# Patient Record
Sex: FEMALE | Race: BLACK OR AFRICAN AMERICAN | Employment: OTHER | URBAN - METROPOLITAN AREA
[De-identification: names, ages, dates, MRNs, and addresses within clinical notes are randomized per-mention and may not be internally consistent; named-entity substitution may affect disease eponyms.]

---

## 2017-01-01 ENCOUNTER — APPOINTMENT (OUTPATIENT)
Dept: ULTRASOUND IMAGING | Facility: HOSPITAL | Age: 47
DRG: 871 | End: 2017-01-01
Attending: INTERNAL MEDICINE
Payer: MEDICARE

## 2017-01-01 ENCOUNTER — APPOINTMENT (OUTPATIENT)
Dept: CT IMAGING | Facility: HOSPITAL | Age: 47
DRG: 871 | End: 2017-01-01
Attending: EMERGENCY MEDICINE
Payer: MEDICARE

## 2017-01-01 ENCOUNTER — APPOINTMENT (OUTPATIENT)
Dept: GENERAL RADIOLOGY | Facility: HOSPITAL | Age: 47
DRG: 871 | End: 2017-01-01
Attending: INTERNAL MEDICINE
Payer: MEDICARE

## 2017-01-01 ENCOUNTER — APPOINTMENT (OUTPATIENT)
Dept: GENERAL RADIOLOGY | Facility: HOSPITAL | Age: 47
DRG: 871 | End: 2017-01-01
Attending: EMERGENCY MEDICINE
Payer: MEDICARE

## 2017-01-01 ENCOUNTER — APPOINTMENT (OUTPATIENT)
Dept: INTERVENTIONAL RADIOLOGY/VASCULAR | Facility: HOSPITAL | Age: 47
DRG: 871 | End: 2017-01-01
Attending: INTERNAL MEDICINE
Payer: MEDICARE

## 2017-01-01 ENCOUNTER — HOSPITAL ENCOUNTER (INPATIENT)
Facility: HOSPITAL | Age: 47
LOS: 2 days | DRG: 871 | End: 2017-01-01
Attending: EMERGENCY MEDICINE | Admitting: HOSPITALIST
Payer: MEDICARE

## 2017-01-01 ENCOUNTER — APPOINTMENT (OUTPATIENT)
Dept: CV DIAGNOSTICS | Facility: HOSPITAL | Age: 47
DRG: 871 | End: 2017-01-01
Attending: INTERNAL MEDICINE
Payer: MEDICARE

## 2017-01-01 VITALS
SYSTOLIC BLOOD PRESSURE: 42 MMHG | WEIGHT: 135 LBS | DIASTOLIC BLOOD PRESSURE: 30 MMHG | OXYGEN SATURATION: 66 % | RESPIRATION RATE: 7 BRPM

## 2017-01-01 DIAGNOSIS — E87.2 LACTIC ACIDOSIS: ICD-10-CM

## 2017-01-01 DIAGNOSIS — I46.9 CARDIAC ARREST (HCC): Primary | ICD-10-CM

## 2017-01-01 DIAGNOSIS — R79.89 ELEVATED LACTIC ACID LEVEL: ICD-10-CM

## 2017-01-01 DIAGNOSIS — J96.00 ACUTE RESPIRATORY FAILURE, UNSPECIFIED WHETHER WITH HYPOXIA OR HYPERCAPNIA (HCC): ICD-10-CM

## 2017-01-01 DIAGNOSIS — D72.829 LEUKOCYTOSIS, UNSPECIFIED TYPE: ICD-10-CM

## 2017-01-01 DIAGNOSIS — N17.9 ACUTE RENAL FAILURE, UNSPECIFIED ACUTE RENAL FAILURE TYPE (HCC): ICD-10-CM

## 2017-01-01 DIAGNOSIS — R77.8 ELEVATED TROPONIN: ICD-10-CM

## 2017-01-01 PROCEDURE — 93306 TTE W/DOPPLER COMPLETE: CPT | Performed by: INTERNAL MEDICINE

## 2017-01-01 PROCEDURE — 06H033Z INSERTION OF INFUSION DEVICE INTO INFERIOR VENA CAVA, PERCUTANEOUS APPROACH: ICD-10-PCS | Performed by: RADIOLOGY

## 2017-01-01 PROCEDURE — 71010 XR CHEST AP PORTABLE  (CPT=71010): CPT | Performed by: EMERGENCY MEDICINE

## 2017-01-01 PROCEDURE — 70450 CT HEAD/BRAIN W/O DYE: CPT | Performed by: EMERGENCY MEDICINE

## 2017-01-01 PROCEDURE — 99223 1ST HOSP IP/OBS HIGH 75: CPT | Performed by: OTHER

## 2017-01-01 PROCEDURE — 93970 EXTREMITY STUDY: CPT | Performed by: INTERNAL MEDICINE

## 2017-01-01 PROCEDURE — 71260 CT THORAX DX C+: CPT | Performed by: EMERGENCY MEDICINE

## 2017-01-01 PROCEDURE — 5A1935Z RESPIRATORY VENTILATION, LESS THAN 24 CONSECUTIVE HOURS: ICD-10-PCS | Performed by: HOSPITALIST

## 2017-01-01 PROCEDURE — B246ZZZ ULTRASONOGRAPHY OF RIGHT AND LEFT HEART: ICD-10-PCS | Performed by: INTERNAL MEDICINE

## 2017-01-01 PROCEDURE — 76770 US EXAM ABDO BACK WALL COMP: CPT | Performed by: INTERNAL MEDICINE

## 2017-01-01 PROCEDURE — 5A1D70Z PERFORMANCE OF URINARY FILTRATION, INTERMITTENT, LESS THAN 6 HOURS PER DAY: ICD-10-PCS | Performed by: RADIOLOGY

## 2017-01-01 PROCEDURE — 71010 XR CHEST AP PORTABLE  (CPT=71010): CPT | Performed by: INTERNAL MEDICINE

## 2017-01-01 PROCEDURE — 74177 CT ABD & PELVIS W/CONTRAST: CPT | Performed by: EMERGENCY MEDICINE

## 2017-01-01 PROCEDURE — 99233 SBSQ HOSP IP/OBS HIGH 50: CPT | Performed by: OTHER

## 2017-01-01 RX ORDER — DEXTROSE MONOHYDRATE 50 MG/ML
INJECTION, SOLUTION INTRAVENOUS CONTINUOUS
Status: DISCONTINUED | OUTPATIENT
Start: 2017-01-01 | End: 2017-01-01

## 2017-01-01 RX ORDER — DEXTROSE MONOHYDRATE 25 G/50ML
INJECTION, SOLUTION INTRAVENOUS
Status: COMPLETED | OUTPATIENT
Start: 2017-01-01 | End: 2017-01-01

## 2017-01-01 RX ORDER — CALCIUM GLUCONATE 94 MG/ML
INJECTION, SOLUTION INTRAVENOUS
Status: COMPLETED
Start: 2017-01-01 | End: 2017-01-01

## 2017-01-01 RX ORDER — MIDAZOLAM HYDROCHLORIDE 5 MG/ML
INJECTION INTRAMUSCULAR; INTRAVENOUS
Status: COMPLETED
Start: 2017-01-01 | End: 2017-01-01

## 2017-01-01 RX ORDER — 3% SODIUM CHLORIDE 3 G/100ML
INJECTION, SOLUTION INTRAVENOUS CONTINUOUS
Status: DISCONTINUED | OUTPATIENT
Start: 2017-01-01 | End: 2017-01-01

## 2017-01-01 RX ORDER — HEPARIN SODIUM AND DEXTROSE 10000; 5 [USP'U]/100ML; G/100ML
INJECTION INTRAVENOUS CONTINUOUS
Status: DISCONTINUED | OUTPATIENT
Start: 2017-01-01 | End: 2017-01-01

## 2017-01-01 RX ORDER — DEXTROSE MONOHYDRATE 50 MG/ML
INJECTION, SOLUTION INTRAVENOUS
Status: COMPLETED
Start: 2017-01-01 | End: 2017-01-01

## 2017-01-01 RX ORDER — SODIUM CHLORIDE 9 MG/ML
INJECTION, SOLUTION INTRAVENOUS
Status: DISPENSED
Start: 2017-01-01 | End: 2017-01-01

## 2017-01-01 RX ORDER — HEPARIN SODIUM 5000 [USP'U]/ML
5000 INJECTION, SOLUTION INTRAVENOUS; SUBCUTANEOUS EVERY 12 HOURS SCHEDULED
Status: DISCONTINUED | OUTPATIENT
Start: 2017-01-01 | End: 2017-01-01

## 2017-01-01 RX ORDER — SODIUM CHLORIDE 0.9 % (FLUSH) 0.9 %
3 SYRINGE (ML) INJECTION AS NEEDED
Status: DISCONTINUED | OUTPATIENT
Start: 2017-01-01 | End: 2017-01-01

## 2017-01-01 RX ORDER — 0.9 % SODIUM CHLORIDE 0.9 %
VIAL (ML) INJECTION
Status: COMPLETED
Start: 2017-01-01 | End: 2017-01-01

## 2017-01-01 RX ORDER — LIDOCAINE HYDROCHLORIDE 20 MG/ML
INJECTION, SOLUTION EPIDURAL; INFILTRATION; INTRACAUDAL; PERINEURAL
Status: COMPLETED
Start: 2017-01-01 | End: 2017-01-01

## 2017-01-01 RX ORDER — HEPARIN SODIUM AND DEXTROSE 10000; 5 [USP'U]/100ML; G/100ML
12 INJECTION INTRAVENOUS ONCE
Status: DISCONTINUED | OUTPATIENT
Start: 2017-01-01 | End: 2017-01-01

## 2017-01-01 RX ORDER — HEPARIN SODIUM 1000 [USP'U]/ML
1.5 INJECTION, SOLUTION INTRAVENOUS; SUBCUTANEOUS AS NEEDED
Status: DISCONTINUED | OUTPATIENT
Start: 2017-01-01 | End: 2017-01-01

## 2017-01-01 RX ORDER — DEXTROSE MONOHYDRATE 25 G/50ML
50 INJECTION, SOLUTION INTRAVENOUS AS NEEDED
Status: DISCONTINUED | OUTPATIENT
Start: 2017-01-01 | End: 2017-01-01

## 2017-01-01 RX ORDER — DEXTROSE MONOHYDRATE 25 G/50ML
INJECTION, SOLUTION INTRAVENOUS
Status: COMPLETED
Start: 2017-01-01 | End: 2017-01-01

## 2017-01-01 RX ORDER — ACETAMINOPHEN 500 MG
1000 TABLET ORAL EVERY 8 HOURS PRN
COMMUNITY

## 2017-01-01 RX ORDER — CALCIUM GLUCONATE 94 MG/ML
1 INJECTION, SOLUTION INTRAVENOUS ONCE
Status: COMPLETED | OUTPATIENT
Start: 2017-01-01 | End: 2017-01-01

## 2017-12-20 PROBLEM — R79.89 ELEVATED LACTIC ACID LEVEL: Status: ACTIVE | Noted: 2017-01-01

## 2017-12-20 PROBLEM — D72.829 LEUKOCYTOSIS, UNSPECIFIED TYPE: Status: ACTIVE | Noted: 2017-01-01

## 2017-12-20 PROBLEM — R77.8 ELEVATED TROPONIN: Status: ACTIVE | Noted: 2017-01-01

## 2017-12-20 PROBLEM — I46.9 CARDIAC ARREST (HCC): Status: ACTIVE | Noted: 2017-01-01

## 2017-12-20 PROBLEM — N17.9 ACUTE RENAL FAILURE, UNSPECIFIED ACUTE RENAL FAILURE TYPE (HCC): Status: ACTIVE | Noted: 2017-01-01

## 2017-12-20 NOTE — H&P
[facility] Hospitalist H&P       CC: Patient presents with:  Cardiac Arrest (cardiovascular)       PCP: No primary care provider on file.     ASSESSMENT / PLAN:   Patient is a 56year old female with PMH sig for cerebellar ataxia dx 2001, depression, ?seizure, poo following pertinent history was noted: cerebella ataxia dx in 2001, depression, altered mental status with possible conversion disorder, poor oral intake, UTI- proteus, b/l eye blindness, ?seizure disorder, recent inpatient psych admit for possible     Basilia Rushing kg)   SpO2 99%   General:  Intubated, not reponsive   Head:  Normocephalic, without obvious abnormality, atraumatic. Eyes:  Pupils dilated, not responsive   Nose: Nares normal. Mucosa normal. No drainage.    Throat: Intubated, cannot asses   Neck: Supple, hours.    Invalid input(s): ALPHOS, TBIL, DBIL, TPROT      Recent Labs   Lab  12/20/17   1221   TROP  0.39*       Additional Diagnostics: ECG:sinus tachy, LAF block and RBBB    CXR: image personally reviewed : no acute infiltrate or edema    Radiology: Xr

## 2017-12-20 NOTE — CONSULTS
NEPHROLOGY CONSULT NOTE     María Barnacle Patient Status:  Inpatient    1970 MRN [id number]   Location [facility] 2W/SW Attending Michel Gonzalez DO   Hosp Day # 0 PCP No primary care provider on file.      DATE: 2017    Requesting Emi Intravenous Q6H   dextrose 5% infusion  Intravenous Continuous   fentaNYL citrate (SUBLIMAZE) 0.05 MG/ML injection 25-50 mcg 25-50 mcg Intravenous Q1H PRN   fentaNYL citrate (SUBLIMAZE) 0.05 MG/ML injection 25-50 mcg 25-50 mcg Intravenous Q1H PRN   propofo and brainstem atrophy. 2. Mild cerebral atrophy. No intra-cranial hemorrhage, mass effect or midline shift. Xr Chest Ap Portable  (cpt=71010)    Result Date: 12/20/2017  CONCLUSION:  1. Nasogastric tube tip just above the melissa.  The tube can be wit tolerate iHD and will need to be started on CRRT. Risks and benefits discussed with  who is agreeable to start CRRT   - appreciate IR help with placement of temp HD cath.    - will run CRRT at blood flow rate of 150 and replacement fluids at 800 ml/

## 2017-12-20 NOTE — ED PROVIDER NOTES
Patient Seen in: [facility] Emergency Department    History   Patient presents with:  Cardiac Arrest (cardiovascular)    Stated Complaint:     HPI  History is provided by nursing report and EMS.     50-year-old female with history of ocular cerebel Cardiovascular:   Tachycardic, weak femoral pulses   Pulmonary/Chest: She has no wheezes. She has no rales. Mechanical breath sounds b/l   Abdominal: Soft. She exhibits no distension.    Genitourinary:   Genitourinary Comments: Foul smelling brown urine 12/20/17 12:21 PM   Result Value Ref Range   Lactic Acid 10.6 (H) 0.5 - 2.2 mmol/L   -URINALYSIS WITH CULTURE REFLEX   Collection Time: 12/20/17 12:21 PM   Result Value Ref Range   Urine Color Robert Sole (A) Yellow   Clarity Urine Hazy (A) Clear   Spec [place] 400 K/UL   MPV 13.8 (H) 7.4 - 10.3 fL   Neutrophil % 83 %   Lymphocyte % 12 %   Monocyte % 5 %   Eosinophil % 0 %   Basophil % 0 %   Neutrophil Absolute 15.8 (H) 1.8 - 7.7 K/UL   Lymphocyte Absolute 2.3 1.0 - 4.0 K/UL   Monocyte Absolute 1.0 0.0 - 1.0 K/UL fields are grossly clear. No pneumothorax.  4. Right-sided central venous catheter projects to the inferior vena cava         Ct Abdomen Pelvis Iv Contrast, No Oral (er)    Result Date: 12/20/2017  CONCLUSION:   4.7 cm hypervascular lesion within the hepati efforts at resuscitation of this patient, return of spontaneous circulation was successful. Pt was alternating between asystole and pulseless vtach. ROSC was eventually achieved.  Pressors switched from levo to dopa  - discussed with Dr. Phillip Valladares - inform respiratory failure, unspecified whether with hypoxia or hypercapnia (HCC)  Lactic acidosis    Disposition:  Admit  12/20/2017  1:13 pm    Follow-up:  No follow-up provider specified.   We recommend that you schedule follow up care with a primary care provi

## 2017-12-20 NOTE — CONSULTS
Reason for Consultation: cardiopulmonary arrest    Assessment/Plan:     1. Cardiopulmonary arrest  - first rhythm PEA in field  - VT in ER  - doubt primary cardiac  2. Shock  - probably due to ELZA, severe metabolic acidosis, +/- sepsis  3. PE  4.  Cerebella UNIT/ML injection 10 Units 10 Units Intravenous Once   magnesium sulfate IVPB premix 4 g 4 g Intravenous Once   hydrocortisone Na succinate PF (SOLU-cortef) injection 100 mg 100 mg Intravenous Once   vasopressin (PITRESSIN) 20 Units in sodium chloride 0.9 HGB  14.6   HCT  48.6*   MCV  97.8   MCH  29.4   MCHC  30.1*   RDW  17.6*   WBC  19.7*   PLT  134*         Imaging:  Xr Chest Ap Portable  (cpt=71010)    Result Date: 12/20/2017  CONCLUSION:  1. Nasogastric tube tip just above the melissa.  The tube can be

## 2017-12-20 NOTE — BRIEF PROCEDURE NOTE
[facility]  Procedure Note    Adriana Gann Patient Status:  Inpatient    1970 MRN [id number]   Location [facility] 2W/SW Attending Jarvis Rosenthal,    Hosp Day # 0 PCP No primary care provider on file.      Procedure: Srinivasa Dun

## 2017-12-20 NOTE — CONSULTS
Critical Care Consult     Assessment / Plan:  1. Cardiac arrest - possibly due to ELZA, sepsis vs other  - TTE  - f/u CTA chest  - hypothermia protocol  - neurology to see  2.  Shock  - wean pressors as able  - IVFs prn  - trend lactic acid  - stress dose st 12/20/17  1340 12/20/17  1414   BP: (!) 57/15 (!) 139/115  (!) 85/61   Pulse: 125 117  85   Resp: 18 25  12   SpO2:    99%   Weight:   135 lb (61.2 kg)      General: intubated  HEENT: OP clear  Respiratory: clear breath sounds bilaterally  Cardiovascular:

## 2017-12-20 NOTE — ED NOTES
Pt received three rounds of epi PTA, resucitated full arrest. Down time of 5-10min per EMS. Pt arrives w/ a pulse. .  Arrives intubated

## 2017-12-21 NOTE — PROGRESS NOTES
[facility] Pharmacy Note:  Renal Dose Adjustment (CRRT)    Collin Butcher has been prescribed piperacillin/tazobactam (ZOSYN) 3.375g IVPB q12h and vancomycin (VANCOCIN) 1500mg IVPB x 1 loading dose, subsequent doses based on levels.   [facility]

## 2017-12-21 NOTE — CM/SW NOTE
Patient was admitted from [facility]. She is intubated at this time and is per report has a poor prognosis. SW will assist as needed.      Cristopher Ferrell [facility]  [id number]

## 2017-12-21 NOTE — PROGRESS NOTES
Patient had improvement in blood pressure this afternoon  - discussed with patient's  Julian Vickers ([phone]) - will attempt CRRT, however if unable to maintain bp, then will have to stop CRRT.  ok with starting CRRT.   Will use 150 blood flow a

## 2017-12-21 NOTE — PROGRESS NOTES
[facility] Cardiology  Progress Note    Ander Pretty Patient Status:  Inpatient    1970 MRN [id number]   Location [facility] 2W/SW Attending Lavinia Jade, [address] [facility] Day # 1 PCP No primary care provider on file. Impression:  1.  C Continuous   Piperacillin Sod-Tazobactam So (ZOSYN) 3.375 g in dextrose 5 % 100 mL ADD-vantage 3.375 g Intravenous Q12H   Heparin Sodium (Porcine) 1000 UNIT/ML injection 1,500 Units 1.5 mL Intravenous PRN   NxStage Pure Flow 400 CRRT fluid 5000mL 800 mL/hr 12/20/2017   HCT 41.3 12/20/2017    12/20/2017       Lab Results  Component Value Date   INR 2.7 (H) 12/21/2017   INR 3.1 (H) 12/20/2017   INR 2.1 (H) 12/20/2017       Lab Results  Component Value Date    12/21/2017   K 3.4 12/21/2017

## 2017-12-21 NOTE — PROGRESS NOTES
Dr. Myriam Hussein was kept up to date with the patient's status throughout the night. As of this note, pt is maxed out on Epi, Levo, Tyrese & Vaso. Insulin gtt is running on Algo6.  Dr. Eliza Herman was at the bedside last night as the Nocturnalist and attempted

## 2017-12-21 NOTE — CONSULTS
Consult dictated patient had witnessed rest but apparently prolonged downtime of 10-15 minutes. Outside records reviewed. Spinal cerebellar degeneration, blind. Question of seizures but outside EEGs are normal and is not on any seizure medication.   Alth

## 2017-12-21 NOTE — PROGRESS NOTES
[facility] Hospitalist Progress Note     CC: Hospital Follow up    PCP: No primary care provider on file.        Assessment/Plan:   Patient is a 56year old female with PMH sig for spinal cerebellar degeneration dx 2001, depression, ?seizure, poor oral intake, pos review from [facility] pt had 2 EEG that were neg and seizure hx was questionable     Depression with possible conversion disorder and possible recent psychosis  -recently admitted to inpatient psychiatry for treatment of this     Recent UTI  -proteus, s/p abx tr withdrawal to pain  HEENT:intubated  Neck: no JVD  Pulm: CTAB, mechanical breath sounds  CV: rrr, no murmurs, peripheral perfusion intact  ABD: Soft,non-distended, no HSM  MSK: no spont movement  Neuro:pupils dilated, not reactive, no withdrawal to pain  P Chest Ap Portable  (cpt=71010)    Result Date: 12/20/2017  CONCLUSION:  1. Nasogastric tube tip just above the melissa. The tube can be withdrawn approximately centimeters for more optimal positioning. 2. Nasogastric tube projects to the distal stomach.  3. 1100)   • Sodium Chloride     • phenylephrine (RORO-SYNEPHRINE) infusion 280 mcg/min (12/21/17 1100)   • insulin regular Stopped (12/21/17 1100)     Normal Saline Flush, fentaNYL citrate, fentaNYL citrate, Heparin Sodium (Porcine), dextrose 50%

## 2017-12-21 NOTE — PROGRESS NOTES
RESPIRATORY THERAPY MECHANICAL VENTILATION PROGRESS NOTE    Patient is not ready for weaning at this time. FIO2 100%       12/21/17 0845   Readings   Total RR 26   Minute Ventilation (L/min) 12.7 L/min   Expiratory Tidal Volume 470 mL   PIP Observed (cm H2O

## 2017-12-21 NOTE — PROGRESS NOTES
Pulmonary Progress Note     Assessment / Plan:  1. Cardiac arrest - possibly due to ELZA, sepsis vs other  - hypothermia protocol  2. Shock  - wean pressors as able  - IVFs per renal  - trend lactic acid  - stress dose steroids  - abx as below  3.  ID - like

## 2017-12-21 NOTE — PROGRESS NOTES
Patient was seen and examined     Gen: intubated not responsive  Heart:S1S2 no murmurs or rubs  Lungs: Ventilator breath sounds  Abd: soft, diminished BS  Ext: trace edema    Plan:  - Currently Blood pressure 66/53 on max pressors  - unable to start CRRT g

## 2017-12-21 NOTE — PROGRESS NOTES
NEPHROLOGY DAILY PROGRESS NOTE       Oneal Boyd Patient Status:  Inpatient    1970 MRN [id number]   Location [facility] 2W/SW Attending Ba Zelaya, [address] [facility] Day # 1 PCP No primary care provider on file.      DATE: 2017    Follow up Continuous   dextrose 50% injection 50 mL 50 mL Intravenous PRN   EPINEPHrine HCl (ADRENALIN) 8 mg in dextrose 5 % 250 mL infusion 1-10 mcg/min Intravenous Continuous   Norepinephrine Bitartrate (LEVOPHED) 8 mg in dextrose 5 % 250 mL infusion 0.5-8 mcg/min Stable position of right neck central line with tip extending through the heart into the inferior vena cava. Consider retraction of the catheter. 3.  Bilateral perihilar pulmonary opacities, new since prior exam suggesting edema.        Xr Chest Ap Portabl negative for obstruction  - R femoral temp HD cath placed on 12/20 - unable to start CRRT due to persistent hypotension with SBP in the 40-70s despite 4 vasopressors.    - If BP stabilizes may be able to start CRRT - will continue to assess   - consider ano

## 2017-12-21 NOTE — PLAN OF CARE
CARDIOVASCULAR - ADULT    • Maintains optimal cardiac output and hemodynamic stability Not Progressing    • Absence of cardiac arrhythmias or at baseline Not Progressing    Remains in NSR upon arrival to CCU post-resuscitation from cardiac arrest. BP remai optimal ventilation and oxygenation Not Progressing    Full support on ventilator with ET in good position. O2 sats remain between 89-95% RT unable to successfully collect ABG, will attempt A line placement.     SAFETY ADULT - FALL    • Free from fall injur

## 2017-12-21 NOTE — PROGRESS NOTES
[facility]    Progress Note    Desirae Quintero Patient Status:  Inpatient    1970 MRN [id number]   Location [facility] 2W/SW Attending Gricel Kulkarni, [address] Day # 1 PCP No primary care provider on file.        Subjective:   Nais Units/min Intravenous Continuous   3% NaCl (hypertonic) infusion  Intravenous Continuous   phenylephrine HCl (RORO-SYNEPHRINE) 100 mg in dextrose 5 % 250 mL infusion 100-200 mcg/min Intravenous Continuous   Insulin Regular Human (NOVOLIN R) 100 Units in sod (qus=12038)    Result Date: 12/20/2017  CONCLUSION: No evidence of left or right lower extremity DVT. Xr Chest Ap Portable  (cpt=71010)    Result Date: 12/20/2017  CONCLUSION:  1.   Satisfactory position of endotracheal tube with tip at the level of t concurrently. Ekg 12-lead    Result Date: 12/20/2017  ECG Report  Interpretation  -------------------------- Sinus Tachycardia -Right bundle branch block with left axis -bifascicular block.  ABNORMAL When compared with ECG of 12/20/2017 11:56:38 Bund

## 2017-12-21 NOTE — CONSULTS
[facility]    PATIENT'S NAME: Brensesarcrystal Nicol   ATTENDING PHYSICIAN: Rickie Beckford DO   CONSULTING PHYSICIAN: Julian Malave MD   PATIENT ACCOUNT#:   [id number]    LOCATION:  [address] [place] [address] #:   [id number]       DATE OF BIRTH:  [birth year]  t: 12/21/2017 05:07:52  [facility] [id number]  L/

## 2017-12-22 NOTE — PROGRESS NOTES
Pt remained maxed out on 4 pressors, unresponsive,  called at 2230 crrt not able to be started bc  Of hypotension, at 0110, hr dropped suddenly down to asystole, no pulse, no bp, Dr Sherry Alfaro pronounced at 18, nephew at bedside,  notified and ca

## 2017-12-22 NOTE — SIGNIFICANT EVENT
Death Note  Patient . TOD 01:13. Family at bedside. No spont resp. Asystole. Family declines brant at this time.

## 2017-12-23 NOTE — H&P
[address] Hospitalist Discharge Summary   Patient ID:  Miles Rajan  [id number]  56year old  2/13/1970    Admit date: 12/20/2017  Discharge date: 12/22/2017  Primary Care Physician: No primary care provider on file.    Attending Physician: No att. providers jori anticoagulation unable to be initiated, Pt subsequently admitted to ICU but remained hypotensive despite 4 pressors at max dose, hypoxic despite 100% fi02 on vent. Plan was for CRRT however pt did not tolerate so CRRT stopped.  Pt later became asystolic, lo

## 2018-01-21 NOTE — DISCHARGE SUMMARY
[facility] Hospitalist Discharge Summary   Patient ID:  Arlen Suarez  [id number]  56year old  2/13/1970     Admit date: 12/20/2017  Discharge date: 12/22/2017  Primary Care Physician: No primary care provider on file.    Attending Physician: No att. providers fou failure and anticoagulation unable to be initiated, Pt subsequently admitted to ICU but remained hypotensive despite 4 pressors at max dose, hypoxic despite 100% fi02 on vent. Plan was for CRRT however pt did not tolerate so CRRT stopped.  Pt later became a

## (undated) NOTE — LETTER
[address] [place] [address]  Authorization for Invasive Procedures  1. I hereby authorize Dr. Arabella Zaldivar , my physician and whomever may be designated as the doctor's assistant, to perform the following operation and/or procedure:   Insertion o performed for the purposes of advancing medicine, science, and/or education, provided my identity is not revealed. If the procedure has been videotaped, the physician/surgeon will obtain the original videotape.  The hospital will not be responsible for stor My signature below affirms that prior to the time of the procedure, I have explained to the patient and/or her legal representative, the risks and benefits involved in the proposed treatment and any reasonable alternative to the proposed treatment.  I have

## (undated) NOTE — LETTER
[facility], [place]  Authorization for Invasive Procedures  1.  I hereby authorize Dr. Pily Blum** , my physician and whomever may be designated as the doctor's assistant, to perform the following operation and/or procedure:  *Inserti performed for the purposes of advancing medicine, science, and/or education, provided my identity is not revealed. If the procedure has been videotaped, the physician/surgeon will obtain the original videotape.  The hospital will not be responsible for stor My signature below affirms that prior to the time of the procedure, I have explained to the patient and/or her legal representative, the risks and benefits involved in the proposed treatment and any reasonable alternative to the proposed treatment.  I have

## (undated) NOTE — LETTER
[address], [place]  Authorization for Invasive Procedures  1.  I hereby authorize Dr. Pepe Dawkins , my physician and whomever may be designated as the doctor's assistant, to perform the following operation and/or procedure:  Arterial performed for the purposes of advancing medicine, science, and/or education, provided my identity is not revealed. If the procedure has been videotaped, the physician/surgeon will obtain the original videotape.  The hospital will not be responsible for stor My signature below affirms that prior to the time of the procedure, I have explained to the patient and/or her legal representative, the risks and benefits involved in the proposed treatment and any reasonable alternative to the proposed treatment.  I have